# Patient Record
Sex: MALE | Race: WHITE | NOT HISPANIC OR LATINO | Employment: FULL TIME
[De-identification: names, ages, dates, MRNs, and addresses within clinical notes are randomized per-mention and may not be internally consistent; named-entity substitution may affect disease eponyms.]

---

## 2023-08-26 ENCOUNTER — HEALTH MAINTENANCE LETTER (OUTPATIENT)
Age: 28
End: 2023-08-26

## 2023-10-02 NOTE — PROGRESS NOTES
"[facility] - Glaucoma clinic  Chief Complaint/Presenting Concern: Glaucoma evaluation   History of Present Illness:   Rian Quigley is a 27 year old patient who presents for evaluation of steroid induced glaucoma. He recently moved here for  residency. Previously treated at the [facility].     Brief summary by date  Patient has a history of steroid response glaucoma since he was treated for an episode of HZO in 2015. After being started on prednisolone, he was noted to have elevated IOPs in the 20s and 30s. He was tapered off of steroids but would have recurrences of HZO. In 2021, he underwent GATT for an IOP of 37mmHg. Since then he has been taking prednisolone every day and valtrex 1000 mg daily with good response.     Today, 10/02/2023, patient states he is doing well on his current regimen of prednisolone every day and Valtrex 1000 mg daily.     Current meds:  Prednisolone every day  Valtrex 1000 mg daily     Relevant Past Medical/Family/Social History:  No past medical history on file.  Relevant Review of Systems:  None are relevant     Diagnosis: Steroid-induced open-angle glaucoma, severe stage, left   Ddx: Steroid-response vs uveitic. More likely steroid response because he had uveitis flare January 2021 with normal IOP, and IOP only brayan later when he was on PF QID for a month   Year diagnosis: 2015  Previous glaucoma surgery/laser:    Right eye: n/a    Left eye: 03/15/2021 GATT  Maximum intraocular pressure: 20/37   Current ophthalmic medications:    Right eye: n/a   Left eye: Pred once a day OS   Valtrex 1G PO BID   Family history of any glaucoma: negative  CCT (um) 10/3/23: 580/476  Refractive status: None  Trauma history: negative  Steroid exposure: positive   Vasospastic disease: Migrane or Raynaud phenomenon: negative  A past hemodynamic crisis or Low BP: negative  Meds AEs/intolerance: None  Focused PMHx: None     Prior testing as per notes:   \"Most recent HVF (6/4/2021) " "left eye  MOON Faster 24-2c sup and inf NS, stable from pre-GATT. MOON Standard 10-2 SN and IN defects abutting central 10 deg but not in central 5 deg. Today 6/4/2021 is a new baseline s/p GATT. Future HVFs should compare to the HVF from 6/4/2021\".    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 Was then switched to lotemax but had IOP spike and then FML (switched about 6 months) but had flare up 06/2019 on FML, so switched back to PF and then valtrex (?500mg TID, pt unsure) was added again. Since then tapered slowly from TID to qd dosing at of August 2019. Used valtrex for about 1 month in June-July 2019.    was followed prev by Dr. Chavira ([facility] in [place])   Per Dipti on 07/25/2022: Doing great. Cont pred daily and vatrex BID.     Plan/Recommendations:  Discussed findings with patient.  Patient has steroid-response glaucoma with evidence of RNFL thinning. This appears stable, per review of [facility] notes. IOP goal left eye mid-low teens, IOP today in the low teens each eye.   No need to escalate IOP lowering therapy in either eye at this time.  Continue current medications:  Prednisolone daily left eye   Valtrex 1000 mg daily     RTC in 5 months VA IOP, VF, OCT RNFL       Physician Attestation     Attending Physician Attestation:  Complete documentation of historical and exam elements from today's encounter can be found in the full encounter summary report (not reduplicated in this progress note). I personally " obtained the chief complaint(s) and history of present illness. I confirmed and edited as necessary the review of systems, past medical/surgical history, family history, social history, and examination findings as documented by others; and I examined the patient myself. I personally reviewed the relevant tests, images, and reports as documented above. I personally reviewed the ophthalmic test(s) associated with this encounter, agree with the interpretation(s) as documented by the resident/fellow and have edited the corresponding report(s) as necessary. I formulated and edited as necessary the assessment and plan and discussed the findings and management plan with the patient and any family members present at the time of the visit.  Yuan Spears M.D., Glaucoma, 10/3/23     My privilege to be part of your care,      John Huertas M.D.  Resident Physician, PGY-2  Department of Ophthalmology

## 2023-10-03 ENCOUNTER — OFFICE VISIT (OUTPATIENT)
Dept: OPHTHALMOLOGY | Facility: CLINIC | Age: 28
End: 2023-10-03
Attending: OPHTHALMOLOGY
Payer: COMMERCIAL

## 2023-10-03 DIAGNOSIS — H40.62X0 STEROID INDUCED GLAUCOMA, LEFT EYE: Primary | ICD-10-CM

## 2023-10-03 DIAGNOSIS — T38.0X5A STEROID INDUCED GLAUCOMA, LEFT EYE: Primary | ICD-10-CM

## 2023-10-03 PROCEDURE — 76514 ECHO EXAM OF EYE THICKNESS: CPT | Performed by: OPHTHALMOLOGY

## 2023-10-03 PROCEDURE — 92133 CPTRZD OPH DX IMG PST SGM ON: CPT | Performed by: OPHTHALMOLOGY

## 2023-10-03 PROCEDURE — 92083 EXTENDED VISUAL FIELD XM: CPT | Performed by: OPHTHALMOLOGY

## 2023-10-03 PROCEDURE — 99213 OFFICE O/P EST LOW 20 MIN: CPT | Performed by: OPHTHALMOLOGY

## 2023-10-03 PROCEDURE — 92002 INTRM OPH EXAM NEW PATIENT: CPT | Mod: GC | Performed by: OPHTHALMOLOGY

## 2023-10-03 RX ORDER — PREDNISOLONE ACETATE 10 MG/ML
1 SUSPENSION/ DROPS OPHTHALMIC DAILY
COMMUNITY
Start: 2015-06-15 | End: 2024-04-18

## 2023-10-03 RX ORDER — VALACYCLOVIR HYDROCHLORIDE 1 G/1
1000 TABLET, FILM COATED ORAL DAILY
COMMUNITY
Start: 2015-06-15 | End: 2024-04-18

## 2023-10-03 ASSESSMENT — SLIT LAMP EXAM - LIDS
COMMENTS: NORMAL
COMMENTS: NORMAL

## 2023-10-03 ASSESSMENT — PACHYMETRY
OS_CT(UM): 476
OD_CT(UM): 580

## 2023-10-03 ASSESSMENT — CUP TO DISC RATIO
OS_RATIO: 0.6
OD_RATIO: 0.35

## 2023-10-03 ASSESSMENT — TONOMETRY
OS_IOP_MMHG: 16
OD_IOP_MMHG: 18
OD_IOP_MMHG: 11
IOP_METHOD: APPLANATION
IOP_METHOD: ICARE
OS_IOP_MMHG: 12

## 2023-10-03 ASSESSMENT — CONF VISUAL FIELD
OD_NORMAL: 1
METHOD: COUNTING FINGERS
OD_INFERIOR_NASAL_RESTRICTION: 0
OS_INFERIOR_NASAL_RESTRICTION: 0
OD_SUPERIOR_TEMPORAL_RESTRICTION: 0
OS_NORMAL: 1
OD_INFERIOR_TEMPORAL_RESTRICTION: 0
OD_SUPERIOR_NASAL_RESTRICTION: 0
OS_SUPERIOR_NASAL_RESTRICTION: 0
OS_INFERIOR_TEMPORAL_RESTRICTION: 0
OS_SUPERIOR_TEMPORAL_RESTRICTION: 0

## 2023-10-03 ASSESSMENT — EXTERNAL EXAM - RIGHT EYE: OD_EXAM: NORMAL

## 2023-10-03 ASSESSMENT — VISUAL ACUITY
OS_SC: 20/20
METHOD: SNELLEN - LINEAR
OD_SC: 20/20
OS_SC+: -2

## 2023-10-03 ASSESSMENT — EXTERNAL EXAM - LEFT EYE: OS_EXAM: NORMAL

## 2023-10-03 NOTE — NURSING NOTE
Chief Complaints and History of Present Illnesses   Patient presents with    Glaucoma Evaluation     Chief Complaint(s) and History of Present Illness(es)       Glaucoma Evaluation              Laterality: both eyes    Associated symptoms: Negative for eye pain, flashes and floaters              Comments    Here for glaucoma evaluation. Last eye exam was 5 months ago. VA doing fine. Using PF left eye. No eye pain. No flashes or floaters.    Luis F Cortez COT 7:27 AM October 3, 2023

## 2024-04-05 DIAGNOSIS — H40.62X0 STEROID INDUCED GLAUCOMA, LEFT EYE: Primary | ICD-10-CM

## 2024-04-05 DIAGNOSIS — T38.0X5A STEROID INDUCED GLAUCOMA, LEFT EYE: Primary | ICD-10-CM

## 2024-04-09 ENCOUNTER — OFFICE VISIT (OUTPATIENT)
Dept: OPHTHALMOLOGY | Facility: CLINIC | Age: 29
End: 2024-04-09
Attending: OPHTHALMOLOGY
Payer: COMMERCIAL

## 2024-04-09 DIAGNOSIS — H40.62X1 MILD STAGE STEROID-INDUCED GLAUCOMA OF LEFT EYE: ICD-10-CM

## 2024-04-09 DIAGNOSIS — T38.0X5A MILD STAGE STEROID-INDUCED GLAUCOMA OF LEFT EYE: ICD-10-CM

## 2024-04-09 PROCEDURE — 92083 EXTENDED VISUAL FIELD XM: CPT | Performed by: OPHTHALMOLOGY

## 2024-04-09 PROCEDURE — 99214 OFFICE O/P EST MOD 30 MIN: CPT | Performed by: OPHTHALMOLOGY

## 2024-04-09 PROCEDURE — 92012 INTRM OPH EXAM EST PATIENT: CPT | Mod: GC | Performed by: OPHTHALMOLOGY

## 2024-04-09 PROCEDURE — 92133 CPTRZD OPH DX IMG PST SGM ON: CPT | Performed by: OPHTHALMOLOGY

## 2024-04-09 ASSESSMENT — TONOMETRY
IOP_METHOD: APPLANATION
OS_IOP_MMHG: 17
OD_IOP_MMHG: 18
IOP_METHOD: TONOPEN
OS_IOP_MMHG: 12
OD_IOP_MMHG: 13

## 2024-04-09 ASSESSMENT — VISUAL ACUITY
OS_SC: 20/20
OS_SC+: -3
OD_SC: 20/20
METHOD: SNELLEN - LINEAR

## 2024-04-09 ASSESSMENT — CUP TO DISC RATIO
OS_RATIO: 0.6
OD_RATIO: 0.45

## 2024-04-09 ASSESSMENT — EXTERNAL EXAM - RIGHT EYE: OD_EXAM: NORMAL

## 2024-04-09 ASSESSMENT — EXTERNAL EXAM - LEFT EYE: OS_EXAM: NORMAL

## 2024-04-09 ASSESSMENT — SLIT LAMP EXAM - LIDS
COMMENTS: NORMAL
COMMENTS: NORMAL

## 2024-04-09 NOTE — NURSING NOTE
Chief Complaints and History of Present Illnesses   Patient presents with    Glaucoma Follow-Up     5-6 month follow up for Steroid-induced open-angle glaucoma, severe stage, left eye.     Patient states vision is stable each eye in the last several months. Patient states compliant with eye medications.      Chief Complaint(s) and History of Present Illness(es)       Glaucoma Follow-Up              Laterality: left eye    Associated symptoms: floaters (one floater, not new).  Negative for dryness, eye pain and flashes    Pain scale: 0/10    Comments: 5-6 month follow up for Steroid-induced open-angle glaucoma, severe stage, left eye.     Patient states vision is stable each eye in the last several months. Patient states compliant with eye medications.               Comments    Ocular meds:     Prednisolone daily left eye     Valtrex 1000 mg daily      PARAG Estevez 8:07 AM 04/09/2024

## 2024-04-09 NOTE — PROGRESS NOTES
[facility] - Glaucoma clinic  Chief Complaint/Presenting Concern: Glaucoma evaluation   History of Present Illness:   Rian Quigley is a 28 year old patient who presents for evaluation of steroid induced glaucoma. 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 Previously treated at the [facility].     Brief summary by date  Patient has a history of steroid response glaucoma since he was treated for an episode of HZO in 2015. After being started on prednisolone, he was noted to have elevated IOPs in the 20s and 30s. He was tapered off of steroids but would have recurrences of HZO. In 2021, he underwent GATT for an IOP of 37mmHg. Since then he has been taking prednisolone every day and valtrex 1000 mg daily with good response.     Today, 04/09/2024, patient states he is doing well on his current regimen of prednisolone every day and Valtrex 1000 mg daily. No visual changes or ocular discomfort.    Relevant Past Medical/Family/Social History:  Past Medical History:   Diagnosis Date    Glaucoma (increased eye pressure) 7/2015     Relevant Review of Systems:  None are relevant     Diagnosis: Steroid-induced open-angle glaucoma, severe stage, left   Ddx: Steroid-response vs uveitic. More likely steroid response because he had uveitis flare January 2021 with normal IOP, and IOP only brayan later when he was on PF QID for a month   Year diagnosis: 2015  Previous glaucoma surgery/laser:    Right eye: n/a    Left eye: 03/15/2021 GATT  Maximum intraocular pressure: 20/37   Current ophthalmic medications:    Right eye: n/a   Left eye: Pred once a day OS   Valtrex 1G PO BID   Family history of any glaucoma: negative  CCT (um) 10/3/23: 580/476  Refractive status: None  Trauma history: negative  Steroid exposure: positive   Vasospastic disease: Migrane or Raynaud phenomenon: negative  A past hemodynamic crisis or Low BP: negative  Meds AEs/intolerance: None  Focused PMHx: None     Prior testing as per notes:    "\"Most recent HVF (6/4/2021) left eye  MOON Faster 24-2c sup and inf NS, stable from pre-GATT. MOON Standard 10-2 SN and IN defects abutting central 10 deg but not in central 5 deg. Today 6/4/2021 is a new baseline s/p GATT. Future HVFs should compare to the HVF from 6/4/2021\".    Today's testing:   IOP: 18/15 mmHg by applanation  Visual field 04/09/24   Right eye - unremarkable, reliable  Left eye - none specific defects, unreliable  OCT Optic Nerve RNFL Spectralis 04/09/24   Right eye: minor inferior changes, possibly segmentation?  Left eye: inferior, superior, and temporal RNFL thinning with minor changes, possibly segmentation    Additional Ocular History:   Herpes zoster keratitis of left eye    Episode of HZO left forehead in 2015, had ocular involvement at that time and was placed on prednisolone and valtrex, had increased IOP and brimonidine was added as well. Was then switched to lotemax but had IOP spike and then FML (switched about 6 months) but had flare up 06/2019 on FML, so switched back to PF and then valtrex (?500mg TID, pt unsure) was added again. Since then tapered slowly from TID to qd dosing at of August 2019. Used valtrex for about 1 month in June-July 2019.    was followed prev by Dr. Chavira ([facility] in [place])   Per Dipti on 07/25/2022: Doing great. Cont pred daily and vatrex BID.     Plan/Recommendations:  Discussed findings with patient.  Patient has steroid-response glaucoma with evidence of RNFL thinning. This appears stable, per review of [facility] notes. IOP goal left eye mid-high teens, IOP today on target each eye.   No need to escalate IOP lowering therapy in either eye at this time.  Today with minor RNFL changes, unclear if segmentation or if represent real changes, will monitor and repeat in 5 months in the setting of stable VF and stable IOP. If needed can restart Timolol.   Continue current medications:  Prednisolone daily left eye   Valtrex 1000 mg daily     RTC " in 5 months VT, OCT RNFL, also establish with cornea    Thank you for entrusting us with your care  Cathleen Reeder MD, PGY3  Ophthalmology Resident  [facility]       Physician Attestation     Attending Physician Attestation:  Complete documentation of historical and exam elements from today's encounter can be found in the full encounter summary report (not reduplicated in this progress note). I personally obtained the chief complaint(s) and history of present illness. I confirmed and edited as necessary the review of systems, past medical/surgical history, family history, social history, and examination findings as documented by others; and I examined the patient myself. I personally reviewed the relevant tests, images, and reports as documented above. I personally reviewed the ophthalmic test(s) associated with this encounter, agree with the interpretation(s) as documented by the resident/fellow and have edited the corresponding report(s) as necessary. I formulated and edited as necessary the assessment and plan and discussed the findings and management plan with the patient and any family members present at the time of the visit.  Yuan Spears M.D., Glaucoma, April 9, 2024

## 2024-04-18 ENCOUNTER — OFFICE VISIT (OUTPATIENT)
Dept: OPHTHALMOLOGY | Facility: CLINIC | Age: 29
End: 2024-04-18
Attending: OPHTHALMOLOGY
Payer: COMMERCIAL

## 2024-04-18 DIAGNOSIS — H17.9 CORNEAL SCAR, LEFT EYE: ICD-10-CM

## 2024-04-18 DIAGNOSIS — B02.21 HZO (HERPES ZOSTER OTICUS): Primary | ICD-10-CM

## 2024-04-18 PROCEDURE — 99213 OFFICE O/P EST LOW 20 MIN: CPT | Mod: GC | Performed by: OPHTHALMOLOGY

## 2024-04-18 PROCEDURE — 99213 OFFICE O/P EST LOW 20 MIN: CPT | Performed by: OPHTHALMOLOGY

## 2024-04-18 RX ORDER — VALACYCLOVIR HYDROCHLORIDE 1 G/1
1000 TABLET, FILM COATED ORAL DAILY
Qty: 34 TABLET | Refills: 4 | Status: SHIPPED | OUTPATIENT
Start: 2024-04-18

## 2024-04-18 RX ORDER — PREDNISOLONE ACETATE 10 MG/ML
1 SUSPENSION/ DROPS OPHTHALMIC DAILY
Qty: 15 ML | Refills: 4 | Status: SHIPPED | OUTPATIENT
Start: 2024-04-18

## 2024-04-18 ASSESSMENT — CONF VISUAL FIELD
OD_INFERIOR_NASAL_RESTRICTION: 0
OS_NORMAL: 1
OD_NORMAL: 1
OS_INFERIOR_TEMPORAL_RESTRICTION: 0
OD_SUPERIOR_TEMPORAL_RESTRICTION: 0
OD_INFERIOR_TEMPORAL_RESTRICTION: 0
OS_INFERIOR_NASAL_RESTRICTION: 0
OS_SUPERIOR_NASAL_RESTRICTION: 0
OD_SUPERIOR_NASAL_RESTRICTION: 0
METHOD: COUNTING FINGERS
OS_SUPERIOR_TEMPORAL_RESTRICTION: 0

## 2024-04-18 ASSESSMENT — VISUAL ACUITY
OD_SC+: -1
OS_SC+: +2
METHOD: SNELLEN - LINEAR
OS_SC: 20/20
OD_SC: 20/15

## 2024-04-18 ASSESSMENT — TONOMETRY
IOP_METHOD: ICARE
OD_IOP_MMHG: 17
OS_IOP_MMHG: 10

## 2024-04-18 ASSESSMENT — SLIT LAMP EXAM - LIDS
COMMENTS: NORMAL
COMMENTS: NORMAL

## 2024-04-18 ASSESSMENT — EXTERNAL EXAM - LEFT EYE: OS_EXAM: NORMAL

## 2024-04-18 ASSESSMENT — EXTERNAL EXAM - RIGHT EYE: OD_EXAM: NORMAL

## 2024-04-18 NOTE — NURSING NOTE
Chief Complaints and History of Present Illnesses   Patient presents with    Corneal Evaluation     Herpes zoster keratitis of left eye      Chief Complaint(s) and History of Present Illness(es)       Corneal Evaluation              Comments: Herpes zoster keratitis of left eye               Comments    Pt states vision is the same as last visit with Dr Spears 1 week ago. No eye pain today.  No new flashes or floaters. No redness. Dryness in each eye, pt does not use drops regularly.  No DM.    JASVIR Mancia April 18, 2024 7:21 AM

## 2024-04-18 NOTE — PROGRESS NOTES
Chief complaint   Zoster eval    HPI    Rian Quigley 28 year old male with history of HZO keratitis in the left eye in 2015 who presents to establish care for cornea. The zoster affected his cornea in the left eye and he has been on Valtrex since then. He has been on steroids to treat corneal scar which raised his IOP. He underwent GATT in 2021 to control IOP. He has had 4-5 flare ups of keratitis in the past. He was treated with steroids for each flare. Last flare was in 2021 prior to GATT.    Past ocular history   Prior eye surgery/laser/Trauma: GATT left eye 2021  CTL wearer:No  Glasses : no  Family Hx of eye disease: none    PMH     Past Medical History:   Diagnosis Date    Glaucoma (increased eye pressure) 7/2015       PSH     Past Surgical History:   Procedure Laterality Date    GLAUCOMA SURGERY  3/2021       Meds     Current Outpatient Medications   Medication Sig Dispense Refill    prednisoLONE acetate (PRED FORTE) 1 % ophthalmic suspension 1 drop      valACYclovir (VALTREX) 1000 mg tablet Take 1,000 mg by mouth       No current facility-administered medications for this visit.       Labs   -    Imaging   -    Drops Currently Taking   Valtrex 1000 once daily  Prednisolone once daily left eye    Assessment/Plan 04/18/2024   # Recurrent HZO keratitis left eye  First episode occurred in 2015 with keratitis in the left eye resulting in corneal scar. Scar treated with topical steroids which raised IOP. Underwent GATT in 2021 and he has been off glaucoma drops since then. Last flare in 2021.  - discussed option of refraction/CL trial to improve vision but he is happy with current vision  - continue Valtrex 1000 mg once daily  - continue Prednisolone once daily left eye    #Steroid response glaucoma, left eye  Underwent GATT in 2021 and has been off IOP lowering drops since then  - following with Dr. Spears         Follow up: 1 year. Call sooner edda roberts issues.  Oph:    Radha Santana MD  PGY3  Ophthalmology Resident  [facility]    Attending Physician Attestation:  Complete documentation of historical and exam elements from today's encounter can be found in the full encounter summary report (not reduplicated in this progress note).  I personally obtained the chief complaint(s) and history of present illness.  I confirmed and edited as necessary the review of systems, past medical/surgical history, family history, social history, and examination findings as documented by others; and I examined the patient myself.  I personally reviewed the relevant tests, images, and reports as documented above.  I formulated and edited as necessary the assessment and plan and discussed the findings and management plan with the patient and family. - Vikas Neumann MD

## 2024-10-04 DIAGNOSIS — T38.0X5A MILD STAGE STEROID-INDUCED GLAUCOMA OF LEFT EYE: Primary | ICD-10-CM

## 2024-10-04 DIAGNOSIS — H40.62X1 MILD STAGE STEROID-INDUCED GLAUCOMA OF LEFT EYE: Primary | ICD-10-CM

## 2024-10-08 ENCOUNTER — OFFICE VISIT (OUTPATIENT)
Dept: OPHTHALMOLOGY | Facility: CLINIC | Age: 29
End: 2024-10-08
Attending: OPHTHALMOLOGY
Payer: COMMERCIAL

## 2024-10-08 DIAGNOSIS — H40.62X1 MILD STAGE STEROID-INDUCED GLAUCOMA OF LEFT EYE: ICD-10-CM

## 2024-10-08 DIAGNOSIS — T38.0X5A MILD STAGE STEROID-INDUCED GLAUCOMA OF LEFT EYE: ICD-10-CM

## 2024-10-08 PROCEDURE — 99213 OFFICE O/P EST LOW 20 MIN: CPT | Performed by: OPHTHALMOLOGY

## 2024-10-08 PROCEDURE — 92133 CPTRZD OPH DX IMG PST SGM ON: CPT | Performed by: OPHTHALMOLOGY

## 2024-10-08 PROCEDURE — 92012 INTRM OPH EXAM EST PATIENT: CPT | Mod: GC | Performed by: OPHTHALMOLOGY

## 2024-10-08 ASSESSMENT — TONOMETRY
OS_IOP_MMHG: 12
OD_IOP_MMHG: 16
IOP_METHOD: TONOPEN
IOP_METHOD: TONOPEN
OS_IOP_MMHG: 15
OD_IOP_MMHG: 16

## 2024-10-08 ASSESSMENT — EXTERNAL EXAM - RIGHT EYE: OD_EXAM: NORMAL

## 2024-10-08 ASSESSMENT — SLIT LAMP EXAM - LIDS
COMMENTS: NORMAL
COMMENTS: NORMAL

## 2024-10-08 ASSESSMENT — VISUAL ACUITY
OS_SC: 20/20
OD_SC: 20/15
OS_SC+: -1
METHOD: SNELLEN - LINEAR

## 2024-10-08 ASSESSMENT — EXTERNAL EXAM - LEFT EYE: OS_EXAM: NORMAL

## 2024-10-08 NOTE — NURSING NOTE
Chief Complaints and History of Present Illnesses   Patient presents with    Steroid-induced Glaucoma Follow Up     Mild stage steroid-induced glaucoma of left eye     Chief Complaint(s) and History of Present Illness(es)       Steroid-induced Glaucoma Follow Up              Laterality: left eye    Associated symptoms: dryness and floaters.  Negative for glare, haloes, eye pain, tearing and flashes    Pain scale: 0/10    Comments: Mild stage steroid-induced glaucoma of left eye              Comments    Pt states vision is the same.  No pain.  No flashes.  No change to floaters.  Pt is compliant with drops.    PARAG Ambriz October 8, 2024 8:09 AM

## 2024-10-08 NOTE — PROGRESS NOTES
[facility] - Glaucoma clinic  Chief Complaint/Presenting Concern: Glaucoma evaluation   History of Present Illness:   Rian Quigley is a 28 year old patient who presents for evaluation of steroid induced glaucoma. He recently moved here for  residency. Previously treated at the [facility].     Brief summary by date  Patient has a history of steroid response glaucoma since he was treated for an episode of HZO in 2015. After being started on prednisolone, he was noted to have elevated IOPs in the 20s and 30s. He was tapered off of steroids but would have recurrences of HZO. In 2021, he underwent GATT for an IOP of 37mmHg.     Since then he has been taking prednisolone every day and valtrex 1000 mg daily with good response.   Today, 10/08/2024, denies any changes. Compliant with eye drops.     Relevant Past Medical/Family/Social History:  Past Medical History:   Diagnosis Date    Glaucoma (increased eye pressure) 7/2015     Relevant Review of Systems:  None are relevant     Diagnosis: Steroid-induced open-angle glaucoma, severe stage, left   Ddx: Steroid-response vs uveitic. More likely steroid response because he had uveitis flare January 2021 with normal IOP, and IOP only brayan later when he was on PF QID for a month   Year diagnosis: 2015  Previous glaucoma surgery/laser:    Right eye: n/a    Left eye: 03/15/2021 GATT  Maximum intraocular pressure: 20/37 mmHg  Current ophthalmic medications:    Right eye: n/a   Left eye: Pred once a day OS, Valtrex 1 g PO QD   Family history of glaucoma: negative  CCT (um) 10/3/23: 580/476  Refractive status: None  Trauma history: negative  Steroid exposure: positive   Vasospastic disease: Migrane or Raynaud phenomenon: negative  A past hemodynamic crisis or Low BP: negative  Meds AEs/intolerance: None  Focused PMHx: None     Prior testing as per notes:  Visual field 04/09/24   Right eye - unremarkable, reliable  Left eye - none specific defects,  unreliable    Today's testing:   IOP: 16 / 12 mmHg by applanation  OCT Optic Nerve RNFL Spectralis 10/08/24   Right eye: green, no changes  Left eye: inferior, superior, and temporal RNFL thinning; no changes since Oct 2023    Additional Ocular History:   Herpes zoster keratitis of left eye    2015: Dx w/ HZO and started on prednisolone and valtrex, had increased IOP and brimonidine was added as well.    2019: Was switched lotemax but had IOP spike. Tried FML but flared 06/2019 on FML, so switched back to PF and valtrex.    Since then tapered slowly from TID to qd dosing at of August 2019. Used valtrex for about 1 month in June-July 2019.    Was followed prev by Dr. Chavira ([facility] in [place])   Per Dipti on 07/25/2022: Doing great. Cont pred daily and valtrex BID.     Plan/Recommendations:  Discussed findings with patient.  Patient has steroid-response glaucoma with evidence of RNFL thinning, now s/p GATT procedure in the left. IOP goal left eye mid-high teens; at target 10/08/24. No progression on RNFL.   No need to escalate IOP lowering therapy in either eye at this time.  Continue current medications:  Prednisolone daily left eye (flared on other meds, and unable to taper without flare)  Valtrex 1000 mg daily (stable; flared off of Valtrex)    RTC in 6 months VT, VF, OCT RNFL, then yearly after that if stable     Thank you for entrusting us with your care    Vincent Brody MD  PGY-3, Ophthalmology  [facility]      Physician Attestation     Attending Physician Attestation:  Complete documentation of historical and exam elements from today's encounter can be found in the full encounter summary report (not reduplicated in this progress note). I personally obtained the chief complaint(s) and history of present illness. I confirmed and edited as necessary the review of systems, past medical/surgical history, family history, social history, and examination findings as documented by others; and I  examined the patient myself. I personally reviewed the relevant tests, images, and reports as documented above. I personally reviewed the ophthalmic test(s) associated with this encounter, agree with the interpretation(s) as documented by the resident/fellow and have edited the corresponding report(s) as necessary. I formulated and edited as necessary the assessment and plan and discussed the findings and management plan with the patient and any family members present at the time of the visit.  Yuan Spears M.D., Glaucoma, October 8, 2024

## 2024-10-19 ENCOUNTER — HEALTH MAINTENANCE LETTER (OUTPATIENT)
Age: 29
End: 2024-10-19

## 2025-04-07 DIAGNOSIS — T38.0X5A MILD STAGE STEROID-INDUCED GLAUCOMA OF LEFT EYE: Primary | ICD-10-CM

## 2025-04-07 DIAGNOSIS — H40.62X1 MILD STAGE STEROID-INDUCED GLAUCOMA OF LEFT EYE: Primary | ICD-10-CM

## 2025-04-14 ENCOUNTER — OFFICE VISIT (OUTPATIENT)
Dept: OPHTHALMOLOGY | Facility: CLINIC | Age: 30
End: 2025-04-14
Attending: OPHTHALMOLOGY
Payer: COMMERCIAL

## 2025-04-14 DIAGNOSIS — H40.62X1 MILD STAGE STEROID-INDUCED GLAUCOMA OF LEFT EYE: Primary | ICD-10-CM

## 2025-04-14 DIAGNOSIS — T38.0X5A MILD STAGE STEROID-INDUCED GLAUCOMA OF LEFT EYE: Primary | ICD-10-CM

## 2025-04-14 DIAGNOSIS — B02.21 HZO (HERPES ZOSTER OTICUS): ICD-10-CM

## 2025-04-14 DIAGNOSIS — H17.9 CORNEAL SCAR, LEFT EYE: ICD-10-CM

## 2025-04-14 PROCEDURE — 99213 OFFICE O/P EST LOW 20 MIN: CPT | Performed by: OPHTHALMOLOGY

## 2025-04-14 ASSESSMENT — CONF VISUAL FIELD
OD_SUPERIOR_TEMPORAL_RESTRICTION: 0
OD_INFERIOR_NASAL_RESTRICTION: 0
OD_SUPERIOR_NASAL_RESTRICTION: 0
OD_NORMAL: 1
OS_INFERIOR_TEMPORAL_RESTRICTION: 0
OS_NORMAL: 1
OS_SUPERIOR_NASAL_RESTRICTION: 0
METHOD: COUNTING FINGERS
OD_INFERIOR_TEMPORAL_RESTRICTION: 0
OS_INFERIOR_NASAL_RESTRICTION: 0
OS_SUPERIOR_TEMPORAL_RESTRICTION: 0

## 2025-04-14 ASSESSMENT — EXTERNAL EXAM - LEFT EYE: OS_EXAM: NORMAL

## 2025-04-14 ASSESSMENT — TONOMETRY
OS_IOP_MMHG: 09
OD_IOP_MMHG: 10
IOP_METHOD: ICARE

## 2025-04-14 ASSESSMENT — VISUAL ACUITY
METHOD: SNELLEN - LINEAR
OS_SC: 20/25
OS_SC+: -1
OD_SC: 20/20

## 2025-04-14 ASSESSMENT — EXTERNAL EXAM - RIGHT EYE: OD_EXAM: NORMAL

## 2025-04-14 ASSESSMENT — PACHYMETRY
OD_CT(UM): 580
OS_CT(UM): 476

## 2025-04-14 ASSESSMENT — SLIT LAMP EXAM - LIDS
COMMENTS: NORMAL
COMMENTS: NORMAL

## 2025-04-14 NOTE — NURSING NOTE
Chief Complaints and History of Present Illnesses   Patient presents with    New Patient     New patient     Chief Complaint(s) and History of Present Illness(es)       New Patient              Laterality: left eye    Associated symptoms: dryness.  Negative for flashes and floaters    Treatments tried: eye drops and artificial tears    Pain scale: 0/10    Comments: New patient              Comments    The patient notes Left eye dryness.  He is using Prednisolone daily in the Left eye.  He is taking Valtrex daily.  The patient notes a very slight blurred Left eye vision when he is comparing his vision to the Right eye.  Narcisa Graham, COA, COA 9:32 AM 04/14/2025

## 2025-04-14 NOTE — PROGRESS NOTES
Chief complaint   Zoster eval    HPI    Rian Quigley 29 year old male with history of HZO keratitis in the left eye in 2015 who presents to establish care for cornea. The zoster affected his cornea in the left eye and he has been on Valtrex since then. He has been on steroids to treat corneal scar which raised his IOP. He underwent GATT in 2021 to control IOP. He has had 4-5 flare ups of keratitis in the past. He was treated with steroids for each flare. Last flare was in 2021 prior to GATT.    Interval hx 04/14/2025  Chief Complaint(s) and History of Present Illness(es)       New Patient    In left eye.  Associated symptoms include dryness.  Negative for flashes and floaters.  Treatments tried include eye drops and artificial tears.  Pain was noted as 0/10. Additional comments: New patient             Comments    The patient notes Left eye dryness.  He is using Prednisolone daily in the Left eye.  He is taking Valtrex daily.  The patient notes a very slight blurred Left eye vision when he is comparing his vision to the Right eye.  Narcisa Graham, COA, COA 9:32 AM 04/14/2025                           Past ocular history   Prior eye surgery/laser/Trauma: GATT left eye 2021  CTL wearer:No  Glasses : no  Family Hx of eye disease: none    PMH     Past Medical History:   Diagnosis Date    Glaucoma (increased eye pressure) 7/2015       [facility]     Past Surgical History:   Procedure Laterality Date    GLAUCOMA SURGERY  3/2021       Meds     Current Outpatient Medications   Medication Sig Dispense Refill    prednisoLONE acetate (PRED FORTE) 1 % ophthalmic suspension Place 1 drop Into the left eye daily 15 mL 4    valACYclovir (VALTREX) 1000 mg tablet Take 1 tablet (1,000 mg) by mouth daily 34 tablet 4     No current facility-administered medications for this visit.       Labs   -    Imaging   -    Drops Currently Taking   Valtrex 1000 once daily  Prednisolone once daily left eye    Assessment/Plan 04/14/2025   #  Recurrent HZO keratitis left eye  First episode occurred in 2015 with keratitis in the left eye resulting in corneal scar. Scar treated with topical steroids which raised IOP. Underwent GATT in 2021 and he has been off glaucoma drops since then. Last flare in 2021.        #Steroid response glaucoma, left eye  Underwent GATT in 2021 and has been off IOP lowering drops since then  - following with Dr. Spears     Plan:  - continue Valtrex 1000 mg once daily  - continue Prednisolone once daily left eye  Ok to taper pred every other day for now    Follow up: 1 year with cornea or cesaritly. Call sooner edda roberts issues.    Attending Physician Attestation:  Complete documentation of historical and exam elements from today's encounter can be found in the full encounter summary report (not reduplicated in this progress note).  I personally obtained the chief complaint(s) and history of present illness.  I confirmed and edited as necessary the review of systems, past medical/surgical history, family history, social history, and examination findings as documented by others; and I examined the patient myself.  I personally reviewed the relevant tests, images, and reports as documented above.  I formulated and edited as necessary the assessment and plan and discussed the findings and management plan with the patient and family. - Norah Lloyd MD